# Patient Record
Sex: MALE | Race: WHITE | NOT HISPANIC OR LATINO | URBAN - METROPOLITAN AREA
[De-identification: names, ages, dates, MRNs, and addresses within clinical notes are randomized per-mention and may not be internally consistent; named-entity substitution may affect disease eponyms.]

---

## 2017-06-16 ENCOUNTER — AMBULATORY - HEALTHEAST (OUTPATIENT)
Dept: CARDIOLOGY | Facility: CLINIC | Age: 42
End: 2017-06-16

## 2017-06-16 DIAGNOSIS — E78.5 HYPERLIPIDEMIA: ICD-10-CM

## 2017-06-16 LAB
CHOLEST SERPL-MCNC: 204 MG/DL
FASTING STATUS PATIENT QL REPORTED: YES
HDLC SERPL-MCNC: 49 MG/DL
LDLC SERPL CALC-MCNC: 131 MG/DL
TRIGL SERPL-MCNC: 120 MG/DL

## 2017-06-19 ENCOUNTER — COMMUNICATION - HEALTHEAST (OUTPATIENT)
Dept: CARDIOLOGY | Facility: CLINIC | Age: 42
End: 2017-06-19

## 2017-06-29 ENCOUNTER — OFFICE VISIT - HEALTHEAST (OUTPATIENT)
Dept: CARDIOLOGY | Facility: CLINIC | Age: 42
End: 2017-06-29

## 2017-06-29 DIAGNOSIS — Z95.2 H/O MITRAL VALVE REPLACEMENT: ICD-10-CM

## 2017-06-29 DIAGNOSIS — R00.2 PALPITATIONS: ICD-10-CM

## 2017-06-29 ASSESSMENT — MIFFLIN-ST. JEOR: SCORE: 1873.98

## 2017-12-06 ENCOUNTER — COMMUNICATION - HEALTHEAST (OUTPATIENT)
Dept: CARDIOLOGY | Facility: CLINIC | Age: 42
End: 2017-12-06

## 2017-12-06 DIAGNOSIS — Z98.890 H/O MITRAL VALVE REPAIR: ICD-10-CM

## 2017-12-06 DIAGNOSIS — R00.2 PALPITATIONS: ICD-10-CM

## 2018-05-31 ENCOUNTER — COMMUNICATION - HEALTHEAST (OUTPATIENT)
Dept: CARDIOLOGY | Facility: CLINIC | Age: 43
End: 2018-05-31

## 2018-05-31 DIAGNOSIS — Z98.890 H/O MITRAL VALVE REPAIR: ICD-10-CM

## 2018-05-31 DIAGNOSIS — R00.2 PALPITATIONS: ICD-10-CM

## 2018-06-05 ENCOUNTER — COMMUNICATION - HEALTHEAST (OUTPATIENT)
Dept: ADMINISTRATIVE | Facility: CLINIC | Age: 43
End: 2018-06-05

## 2018-06-12 ENCOUNTER — COMMUNICATION - HEALTHEAST (OUTPATIENT)
Dept: CARDIOLOGY | Facility: CLINIC | Age: 43
End: 2018-06-12

## 2018-06-12 DIAGNOSIS — Z95.2 MITRAL VALVE REPLACED: ICD-10-CM

## 2018-07-18 ENCOUNTER — COMMUNICATION - HEALTHEAST (OUTPATIENT)
Dept: CARDIOLOGY | Facility: CLINIC | Age: 43
End: 2018-07-18

## 2018-07-18 ENCOUNTER — HOSPITAL ENCOUNTER (OUTPATIENT)
Dept: CARDIOLOGY | Facility: CLINIC | Age: 43
Discharge: HOME | End: 2018-07-18
Attending: INTERNAL MEDICINE

## 2018-07-18 DIAGNOSIS — Z95.2 MITRAL VALVE REPLACED: ICD-10-CM

## 2018-07-18 LAB
BSA FOR ECHO PROCEDURE: 2.18 M2
CV BLOOD PRESSURE: NORMAL MMHG
CV ECHO HEIGHT: 72 IN
CV ECHO WEIGHT: 206 LBS
DOP CALC MV VTI: 45.4 CM
DOP CALC MV VTI: 45.4 CM
EJECTION FRACTION: 58 % (ref 55–75)
LEFT ATRIUM SIZE: 3.6 CM
LEFT VENTRICLE DIASTOLIC VOLUME INDEX: 65.1 CM3/M2 (ref 34–74)
LEFT VENTRICLE DIASTOLIC VOLUME: 142 CM3 (ref 62–150)
LEFT VENTRICLE SYSTOLIC VOLUME INDEX: 27.5 CM3/M2 (ref 11–31)
LEFT VENTRICLE SYSTOLIC VOLUME: 60 CM3 (ref 21–61)
MITRAL VALVE DECELERATION SLOPE: 4670 MM/S2
MITRAL VALVE DECELERATION SLOPE: 4670 MM/S2
MITRAL VALVE E/A RATIO: 2
MITRAL VALVE MEAN INFLOW VELOCITY: 78.4 CM/S
MITRAL VALVE MEAN INFLOW VELOCITY: 78.4 CM/S
MITRAL VALVE PEAK VELOCITY: 149 CM/S
MITRAL VALVE PEAK VELOCITY: 149 CM/S
MITRAL VALVE PRESSURE HALF-TIME: 89 MS
MV MEAN GRADIENT: 4 MMHG
MV PEAK A VELOCITY: 0.8 CM/S
MV PEAK E VELOCITY: 1.6 CM/S
MV VALVE AREA PRESSURE 1/2 METHOD: 2.5 CM2
NUC REST DIASTOLIC VOLUME INDEX: 3296 LBS
NUC REST SYSTOLIC VOLUME INDEX: 72 IN
TRICUSPID REGURGITATION PEAK PRESSURE GRADIENT: 14.4 MMHG
TRICUSPID VALVE ANULAR PLANE SYSTOLIC EXCURSION: 2.5 CM
TRICUSPID VALVE PEAK REGURGITANT VELOCITY: 190 CM/S

## 2018-07-18 ASSESSMENT — MIFFLIN-ST. JEOR: SCORE: 1862.41

## 2018-07-25 ENCOUNTER — OFFICE VISIT - HEALTHEAST (OUTPATIENT)
Dept: CARDIOLOGY | Facility: CLINIC | Age: 43
End: 2018-07-25

## 2018-07-25 DIAGNOSIS — Z98.890 S/P MVR (MITRAL VALVE REPAIR): ICD-10-CM

## 2018-07-25 ASSESSMENT — MIFFLIN-ST. JEOR: SCORE: 1876.02

## 2018-07-26 LAB
ATRIAL RATE - MUSE: 51 BPM
DIASTOLIC BLOOD PRESSURE - MUSE: NORMAL MMHG
INTERPRETATION ECG - MUSE: NORMAL
P AXIS - MUSE: 46 DEGREES
PR INTERVAL - MUSE: 222 MS
QRS DURATION - MUSE: 98 MS
QT - MUSE: 452 MS
QTC - MUSE: 416 MS
R AXIS - MUSE: 80 DEGREES
SYSTOLIC BLOOD PRESSURE - MUSE: NORMAL MMHG
T AXIS - MUSE: 33 DEGREES
VENTRICULAR RATE- MUSE: 51 BPM

## 2018-08-29 ENCOUNTER — OFFICE VISIT - HEALTHEAST (OUTPATIENT)
Dept: FAMILY MEDICINE | Facility: CLINIC | Age: 43
End: 2018-08-29

## 2018-08-29 DIAGNOSIS — E78.00 HYPERCHOLESTEREMIA: ICD-10-CM

## 2018-08-29 DIAGNOSIS — Z95.2 H/O MITRAL VALVE REPLACEMENT: ICD-10-CM

## 2018-08-29 DIAGNOSIS — F41.9 ANXIETY: ICD-10-CM

## 2018-08-29 DIAGNOSIS — R00.2 PALPITATIONS: ICD-10-CM

## 2018-08-29 DIAGNOSIS — E66.3 OVERWEIGHT (BMI 25.0-29.9): ICD-10-CM

## 2018-08-29 DIAGNOSIS — Z00.00 ROUTINE GENERAL MEDICAL EXAMINATION AT A HEALTH CARE FACILITY: ICD-10-CM

## 2018-08-29 LAB
ANION GAP SERPL CALCULATED.3IONS-SCNC: 10 MMOL/L (ref 5–18)
BUN SERPL-MCNC: 9 MG/DL (ref 8–22)
CALCIUM SERPL-MCNC: 10.1 MG/DL (ref 8.5–10.5)
CHLORIDE BLD-SCNC: 103 MMOL/L (ref 98–107)
CHOLEST SERPL-MCNC: 231 MG/DL
CO2 SERPL-SCNC: 26 MMOL/L (ref 22–31)
CREAT SERPL-MCNC: 0.85 MG/DL (ref 0.7–1.3)
FASTING STATUS PATIENT QL REPORTED: YES
GFR SERPL CREATININE-BSD FRML MDRD: >60 ML/MIN/1.73M2
GLUCOSE BLD-MCNC: 92 MG/DL (ref 70–125)
HDLC SERPL-MCNC: 58 MG/DL
LDLC SERPL CALC-MCNC: 160 MG/DL
MAGNESIUM SERPL-MCNC: 2.1 MG/DL (ref 1.8–2.6)
POTASSIUM BLD-SCNC: 4.6 MMOL/L (ref 3.5–5)
SODIUM SERPL-SCNC: 139 MMOL/L (ref 136–145)
TRIGL SERPL-MCNC: 63 MG/DL
TSH SERPL DL<=0.005 MIU/L-ACNC: 1.94 UIU/ML (ref 0.3–5)

## 2018-08-29 RX ORDER — CLONAZEPAM 1 MG/1
0.5-1 TABLET ORAL 2 TIMES DAILY PRN
Qty: 30 TABLET | Refills: 1 | Status: SHIPPED | OUTPATIENT
Start: 2018-08-29

## 2018-08-29 ASSESSMENT — MIFFLIN-ST. JEOR: SCORE: 1861.28

## 2018-11-22 ENCOUNTER — COMMUNICATION - HEALTHEAST (OUTPATIENT)
Dept: CARDIOLOGY | Facility: CLINIC | Age: 43
End: 2018-11-22

## 2018-11-22 DIAGNOSIS — R00.2 PALPITATIONS: ICD-10-CM

## 2018-11-22 DIAGNOSIS — Z98.890 H/O MITRAL VALVE REPAIR: ICD-10-CM

## 2019-05-15 ENCOUNTER — COMMUNICATION - HEALTHEAST (OUTPATIENT)
Dept: CARDIOLOGY | Facility: CLINIC | Age: 44
End: 2019-05-15

## 2019-05-15 DIAGNOSIS — R00.2 PALPITATIONS: ICD-10-CM

## 2019-05-15 DIAGNOSIS — Z98.890 H/O MITRAL VALVE REPAIR: ICD-10-CM

## 2019-06-11 ENCOUNTER — COMMUNICATION - HEALTHEAST (OUTPATIENT)
Dept: CARDIOLOGY | Facility: CLINIC | Age: 44
End: 2019-06-11

## 2019-06-11 DIAGNOSIS — Z98.890 H/O MITRAL VALVE REPAIR: ICD-10-CM

## 2019-06-11 DIAGNOSIS — R00.2 PALPITATIONS: ICD-10-CM

## 2019-09-20 ENCOUNTER — COMMUNICATION - HEALTHEAST (OUTPATIENT)
Dept: CARDIOLOGY | Facility: CLINIC | Age: 44
End: 2019-09-20

## 2019-09-20 DIAGNOSIS — Z98.890 H/O MITRAL VALVE REPAIR: ICD-10-CM

## 2019-09-20 DIAGNOSIS — R00.2 PALPITATIONS: ICD-10-CM

## 2020-09-11 ENCOUNTER — COMMUNICATION - HEALTHEAST (OUTPATIENT)
Dept: FAMILY MEDICINE | Facility: CLINIC | Age: 45
End: 2020-09-11

## 2020-09-11 DIAGNOSIS — Z98.890 H/O MITRAL VALVE REPAIR: ICD-10-CM

## 2020-09-11 DIAGNOSIS — R00.2 PALPITATIONS: ICD-10-CM

## 2020-09-12 ENCOUNTER — AMBULATORY - HEALTHEAST (OUTPATIENT)
Dept: FAMILY MEDICINE | Facility: CLINIC | Age: 45
End: 2020-09-12

## 2020-12-17 ENCOUNTER — COMMUNICATION - HEALTHEAST (OUTPATIENT)
Dept: FAMILY MEDICINE | Facility: CLINIC | Age: 45
End: 2020-12-17

## 2020-12-17 DIAGNOSIS — Z98.890 H/O MITRAL VALVE REPAIR: ICD-10-CM

## 2020-12-17 DIAGNOSIS — R00.2 PALPITATIONS: ICD-10-CM

## 2021-03-15 ENCOUNTER — COMMUNICATION - HEALTHEAST (OUTPATIENT)
Dept: FAMILY MEDICINE | Facility: CLINIC | Age: 46
End: 2021-03-15

## 2021-03-15 DIAGNOSIS — R00.2 PALPITATIONS: ICD-10-CM

## 2021-03-15 DIAGNOSIS — Z98.890 H/O MITRAL VALVE REPAIR: ICD-10-CM

## 2021-04-13 ENCOUNTER — COMMUNICATION - HEALTHEAST (OUTPATIENT)
Dept: FAMILY MEDICINE | Facility: CLINIC | Age: 46
End: 2021-04-13

## 2021-04-13 DIAGNOSIS — R00.2 PALPITATIONS: ICD-10-CM

## 2021-04-13 DIAGNOSIS — Z98.890 H/O MITRAL VALVE REPAIR: ICD-10-CM

## 2021-05-07 ENCOUNTER — RECORDS - HEALTHEAST (OUTPATIENT)
Dept: FAMILY MEDICINE | Facility: CLINIC | Age: 46
End: 2021-05-07

## 2021-05-31 VITALS — BODY MASS INDEX: 27.41 KG/M2 | HEIGHT: 73 IN | WEIGHT: 206.8 LBS

## 2021-06-01 VITALS — BODY MASS INDEX: 27.9 KG/M2 | WEIGHT: 206 LBS | HEIGHT: 72 IN

## 2021-06-01 VITALS — WEIGHT: 209 LBS | HEIGHT: 72 IN | BODY MASS INDEX: 28.31 KG/M2

## 2021-06-02 ENCOUNTER — RECORDS - HEALTHEAST (OUTPATIENT)
Dept: ADMINISTRATIVE | Facility: OTHER | Age: 46
End: 2021-06-02

## 2021-06-02 VITALS — WEIGHT: 204 LBS | BODY MASS INDEX: 27.04 KG/M2 | HEIGHT: 73 IN

## 2021-06-02 DIAGNOSIS — R00.2 PALPITATIONS: ICD-10-CM

## 2021-06-02 DIAGNOSIS — Z98.890 H/O MITRAL VALVE REPAIR: ICD-10-CM

## 2021-06-02 RX ORDER — ATENOLOL 50 MG/1
TABLET ORAL
Qty: 15 TABLET | Refills: 1 | Status: SHIPPED | OUTPATIENT
Start: 2021-06-02

## 2021-06-11 NOTE — PROGRESS NOTES
[facility] Progress Note    Assessment:  1.  History of mitral valve repair in 2005 for progressive mitral insufficiency [facility].  Clinically doing well.  He will continue to monitor for symptoms.  Echocardiogram from June 2015 demonstrated normal left ventricular systolic function and mild mitral insufficiency.  He has been on atenolol for palpitations and palpitation symptoms have continued to be quiescent.  He continues with antibiotic prophylaxis for dental procedures.    2.  Hyperlipidemia.  Lipid results completed recently demonstrates an improvement cholesterol 204 and an LDL cholesterol 131 compared to 169 in September 2016.  He will continue with a regular prudent diet and exercise as we discussed and reviewed.        Plan: Follow-up 1 year or sooner specific issues were to arise    1. H/O mitral valve replacement     2. Palpitations           An After Visit Summary was printed and given to the patient.    Subjective:    Uvaldo Brown is a 42 y.o. male who returned for a planned  follow up visit.  He reports that he has been feeling very well.  Has been walking/jogging upwards of 5-6 miles per day without specific cardiac complaints.  He underwent mitral valve repair in 2005 [facility] for progressive mitral insufficiency.  The most recent echocardiogram from 2015 demonstrated mild mitral insufficiency and stable valve repair characteristics.  His lipid panel is reviewed from today and lipids are improved.  Calculated his 10 year cardiac risk which would be estimated at 0.9%.    Review of Systems:   General: WNL  Eyes: WNL  Ears/Nose/Throat: WNL  Lungs: WNL  Heart: WNL  Stomach: WNL  Bladder: WNL  Muscle/Joints: WNL  Skin: WNL  Nervous System: WNL  Mental Health: WNL            Problem List:    Patient Active Problem List   Diagnosis     Anxiety     Palpitations     Overweight (BMI 25.0-29.9)     H/O mitral valve replacement     Hypercholesteremia     Tongue swelling  "      Social History     Social History     Marital status:      Spouse name: N/A     Number of children: N/A     Years of education: N/A     Occupational History     Not on file.     Social History Main Topics     Smoking status: Former Smoker     Smokeless tobacco: Not on file     Alcohol use Not on file     Drug use: Not on file     Sexual activity: Not on file     Other Topics Concern     Not on file     Social History Narrative       Family History   Problem Relation Age of Onset     Adopted: Yes       Current Outpatient Prescriptions   Medication Sig Dispense Refill     aspirin 81 MG EC tablet Take 81 mg by mouth daily.       atenolol (TENORMIN) 50 MG tablet Take 0.5 tablets (25 mg total) by mouth daily. 45 tablet 5     clindamycin (CLEOCIN) 300 MG capsule Pre dental       clonazePAM (KLONOPIN) 0.25 MG disintegrating tablet Take 0.25 mg by mouth 2 (two) times a day as needed for anxiety.       No current facility-administered medications for this visit.        Objective:     /80 (Patient Site: Left Arm, Patient Position: Sitting, Cuff Size: Adult Regular)  Pulse (!) 55  Resp 16  Ht 6' 0.5\" (1.842 m)  Wt 206 lb 12.8 oz (93.8 kg)  BMI 27.66 kg/m2  206 lb 12.8 oz (93.8 kg)       Physical Exam:    GENERAL APPEARANCE: alert, no apparent distress  HEENT: no scleral icterus or xanthelasma  NECK: jugular venous pressure within normal limits  CHEST: symmetric, the lungs are clear to auscultation, chest wall well-healed  CARDIOVASCULAR: regular rhythm with 1 to 2/6 systolic murmur at the left ventricular apex; no carotid bruits  Abdomen: No Organomegaly, masses, bruits, or tenderness. Bowels sounds are present      EXTREMITIES: no cyanosis, clubbing or edema    Cardiac Testing:  Procedure Date  Date: 06/12/2015 Start: 01:55 PM     Study Location: [facility]  Technical Quality: Adequate visualization     Patient Status: Routine     Height: 73 inches Weight: 200 pounds BSA: 2.15 m^2 BMI: 26.39 " kg/m^2     Indications  Mitral valve disorder.      Conclusions       Summary   Immobile and shortened posterior leaflet c/w repair.   Mild mitral regurgitation.   Mild diastolic gradient, no significant mitral stenosis.   Normal left ventricular size and systolic function.   Normal left ventricular wall thickness.   Left ventricular ejection fraction is calculated to be 57%.   No regional wall motion abnormalities.   This study was compared with a previously done echocardiogram 1/23/14. The   results are similar.    Lab Results:    Lab Results   Component Value Date     09/22/2016    K 4.8 09/22/2016     09/22/2016    CO2 25 09/22/2016    BUN 11 09/22/2016    CREATININE 0.92 09/22/2016    CALCIUM 9.7 09/22/2016     Lab Results   Component Value Date    CHOL 204 (H) 06/16/2017    TRIG 120 06/16/2017    HDL 49 06/16/2017     No results found for: BNP  Creatinine (mg/dL)   Date Value   09/22/2016 0.92   06/24/2015 0.95     LDL Calculated (mg/dL)   Date Value   06/16/2017 131 (H)   09/22/2016 169 (H)   06/24/2015 149 (H)     Lab Results   Component Value Date    WBC 5.4 09/22/2016    WBC 4.0 06/24/2015    HGB 16.0 09/22/2016    HCT 47.0 09/22/2016    MCV 89 09/22/2016     09/22/2016               This note has been dictated using voice recognition software. Any grammatical or context distortions are unintentional and inherent to the software.      Uvaldo Friedman M.D., F.A.C.C.  [facility], [place]  [phone]

## 2021-06-11 NOTE — TELEPHONE ENCOUNTER
RN cannot approve Refill Request    RN can NOT refill this medication PCP messaged that patient is overdue for Labs. Last office visit: Visit date not found Last Physical: 8/29/2018 Last MTM visit: Visit date not found Last visit same specialty: Visit date not found.  Next visit within 3 mo: Visit date not found  Next physical within 3 mo: Visit date not found      Anna Chandler, [facility] Triage/Med Refill 9/12/2020    Requested Prescriptions   Pending Prescriptions Disp Refills     atenoloL (TENORMIN) 50 MG tablet 45 tablet 3     Sig: Take 0.5 tablets (25 mg total) by mouth daily.       Beta-Blockers Refill Protocol Failed - 9/11/2020  3:45 PM        Failed - PCP or prescribing provider visit in past 12 months or next 3 months     Last office visit with prescriber/PCP: Visit date not found OR same dept: Visit date not found OR same specialty: Visit date not found  Last physical: 8/29/2018 Last MTM visit: Visit date not found   Next visit within 3 mo: Visit date not found  Next physical within 3 mo: Visit date not found  Prescriber OR PCP: Pritesh Lira MD  Last diagnosis associated with med order: 1. H/O mitral valve repair  - atenoloL (TENORMIN) 50 MG tablet; Take 0.5 tablets (25 mg total) by mouth daily.  Dispense: 45 tablet; Refill: 3    2. Palpitations  - atenoloL (TENORMIN) 50 MG tablet; Take 0.5 tablets (25 mg total) by mouth daily.  Dispense: 45 tablet; Refill: 3    If protocol passes may refill for 12 months if within 3 months of last provider visit (or a total of 15 months).             Failed - Blood pressure filed in past 12 months     BP Readings from Last 1 Encounters:   08/29/18 110/60

## 2021-06-13 NOTE — TELEPHONE ENCOUNTER
RN cannot approve Refill Request    RN can NOT refill this medication PCP messaged that patient is overdue for Office Visit. Last office visit: Visit date not found Last Physical: 8/29/2018 Last MTM visit: Visit date not found Last visit same specialty: Visit date not found.  Next visit within 3 mo: Visit date not found  Next physical within 3 mo: Visit date not found      Anna Chandler, [facility] Triage/Med Refill 12/19/2020    Requested Prescriptions   Pending Prescriptions Disp Refills     atenoloL (TENORMIN) 50 MG tablet [Pharmacy Med Name: ATENOLOL 50 MG TABLET] 45 tablet 0     Sig: TAKE 1/2 TABLET BY MOUTH DAILY       Beta-Blockers Refill Protocol Failed - 12/17/2020  8:32 AM        Failed - PCP or prescribing provider visit in past 12 months or next 3 months     Last office visit with prescriber/PCP: Visit date not found OR same dept: Visit date not found OR same specialty: Visit date not found  Last physical: 8/29/2018 Last MTM visit: Visit date not found   Next visit within 3 mo: Visit date not found  Next physical within 3 mo: Visit date not found  Prescriber OR PCP: Pritesh Lira MD  Last diagnosis associated with med order: 1. H/O mitral valve repair  - atenoloL (TENORMIN) 50 MG tablet [Pharmacy Med Name: ATENOLOL 50 MG TABLET]; TAKE 1/2 TABLET BY MOUTH DAILY  Dispense: 45 tablet; Refill: 0    2. Palpitations  - atenoloL (TENORMIN) 50 MG tablet [Pharmacy Med Name: ATENOLOL 50 MG TABLET]; TAKE 1/2 TABLET BY MOUTH DAILY  Dispense: 45 tablet; Refill: 0    If protocol passes may refill for 12 months if within 3 months of last provider visit (or a total of 15 months).             Failed - Blood pressure filed in past 12 months     BP Readings from Last 1 Encounters:   08/29/18 110/60

## 2021-06-15 NOTE — TELEPHONE ENCOUNTER
RN cannot approve Refill Request    RN can NOT refill this medication Protocol failed and NO refill given. Last office visit: Visit date not found Last Physical: 8/29/2018 Last MTM visit: Visit date not found Last visit same specialty: Visit date not found.  Next visit within 3 mo: Visit date not found  Next physical within 3 mo: Visit date not found      Evan Mcintyre, [facility] Triage/Med Refill 3/15/2021    Requested Prescriptions   Pending Prescriptions Disp Refills     atenoloL (TENORMIN) 50 MG tablet [Pharmacy Med Name: ATENOLOL 50 MG TABLET] 45 tablet 0     Sig: TAKE 1/2 TABLET BY MOUTH EVERY DAY       Beta-Blockers Refill Protocol Failed - 3/15/2021  2:35 AM        Failed - PCP or prescribing provider visit in past 12 months or next 3 months     Last office visit with prescriber/PCP: Visit date not found OR same dept: Visit date not found OR same specialty: Visit date not found  Last physical: 8/29/2018 Last MTM visit: Visit date not found   Next visit within 3 mo: Visit date not found  Next physical within 3 mo: Visit date not found  Prescriber OR PCP: Pritesh Lira MD  Last diagnosis associated with med order: 1. H/O mitral valve repair  - atenoloL (TENORMIN) 50 MG tablet [Pharmacy Med Name: ATENOLOL 50 MG TABLET]; TAKE 1/2 TABLET BY MOUTH EVERY DAY  Dispense: 45 tablet; Refill: 0    2. Palpitations  - atenoloL (TENORMIN) 50 MG tablet [Pharmacy Med Name: ATENOLOL 50 MG TABLET]; TAKE 1/2 TABLET BY MOUTH EVERY DAY  Dispense: 45 tablet; Refill: 0    If protocol passes may refill for 12 months if within 3 months of last provider visit (or a total of 15 months).             Failed - Blood pressure filed in past 12 months     BP Readings from Last 1 Encounters:   08/29/18 110/60

## 2021-06-16 NOTE — TELEPHONE ENCOUNTER
RN cannot approve Refill Request    RN can NOT refill this medication PCP messaged that patient is overdue for Office Visit. Last office visit: Visit date not found Last Physical: 8/29/2018 Last MTM visit: Visit date not found Last visit same specialty: Visit date not found.  Next visit within 3 mo: Visit date not found  Next physical within 3 mo: Visit date not found      Anna Chandler, [facility] Triage/Med Refill 4/13/2021    Requested Prescriptions   Pending Prescriptions Disp Refills     atenoloL (TENORMIN) 50 MG tablet [Pharmacy Med Name: ATENOLOL 50 MG TABLET] 15 tablet 0     Sig: TAKE 1/2 TABLET BY MOUTH EVERY DAY       Beta-Blockers Refill Protocol Failed - 4/13/2021 10:31 AM        Failed - PCP or prescribing provider visit in past 12 months or next 3 months     Last office visit with prescriber/PCP: Visit date not found OR same dept: Visit date not found OR same specialty: Visit date not found  Last physical: 8/29/2018 Last MTM visit: Visit date not found   Next visit within 3 mo: Visit date not found  Next physical within 3 mo: Visit date not found  Prescriber OR PCP: Pritesh Lira MD  Last diagnosis associated with med order: 1. H/O mitral valve repair  - atenoloL (TENORMIN) 50 MG tablet [Pharmacy Med Name: ATENOLOL 50 MG TABLET]; TAKE 1/2 TABLET BY MOUTH EVERY DAY  Dispense: 15 tablet; Refill: 0    2. Palpitations  - atenoloL (TENORMIN) 50 MG tablet [Pharmacy Med Name: ATENOLOL 50 MG TABLET]; TAKE 1/2 TABLET BY MOUTH EVERY DAY  Dispense: 15 tablet; Refill: 0    If protocol passes may refill for 12 months if within 3 months of last provider visit (or a total of 15 months).             Failed - Blood pressure filed in past 12 months     BP Readings from Last 1 Encounters:   08/29/18 110/60

## 2021-06-17 NOTE — TELEPHONE ENCOUNTER
RN cannot approve Refill Request    RN can NOT refill this medication PCP messaged that patient is overdue for Office Visit and blood pressure check. . Last office visit: Visit date not found Last Physical: 8/29/2018 Last MTM visit: Visit date not found Last visit same specialty: Visit date not found.  Next visit within 3 mo: Visit date not found  Next physical within 3 mo: Visit date not found      Pinky Lovell, [facility] Triage/Med Refill 5/7/2021    Requested Prescriptions   Pending Prescriptions Disp Refills     atenoloL (TENORMIN) 50 MG tablet [Pharmacy Med Name: ATENOLOL 50 MG TABLET] 15 tablet 0     Sig: TAKE 1/2 TABLET BY MOUTH EVERY DAY       Beta-Blockers Refill Protocol Failed - 5/7/2021 10:31 AM        Failed - PCP or prescribing provider visit in past 12 months or next 3 months     Last office visit with prescriber/PCP: Visit date not found OR same dept: Visit date not found OR same specialty: Visit date not found  Last physical: 8/29/2018 Last MTM visit: Visit date not found   Next visit within 3 mo: Visit date not found  Next physical within 3 mo: Visit date not found  Prescriber OR PCP: Pritesh Lira MD  Last diagnosis associated with med order: 1. H/O mitral valve repair  - atenoloL (TENORMIN) 50 MG tablet [Pharmacy Med Name: ATENOLOL 50 MG TABLET]; TAKE 1/2 TABLET BY MOUTH EVERY DAY  Dispense: 15 tablet; Refill: 0    2. Palpitations  - atenoloL (TENORMIN) 50 MG tablet [Pharmacy Med Name: ATENOLOL 50 MG TABLET]; TAKE 1/2 TABLET BY MOUTH EVERY DAY  Dispense: 15 tablet; Refill: 0    If protocol passes may refill for 12 months if within 3 months of last provider visit (or a total of 15 months).             Failed - Blood pressure filed in past 12 months     BP Readings from Last 1 Encounters:   08/29/18 110/60

## 2021-06-19 NOTE — PROGRESS NOTES
[facility] Progress Note    Assessment:  1.  History of mitral valve repair in 2005 for progressive mitral insufficiency.  Clinically doing well.  Most recent echocardiogram continues to demonstrate normal left ventricular systolic function with good mitral valve repair and mild mitral regurgitation.  He continues with antibiotic prophylaxis for dental procedures.    2.  Hyperlipidemia.  He has no other identified risk factors.  We did talk about the potential for coronary calcium scoring and its potential usefulness to further risk stratify.  He was going to do some additional reading in this regard.    3.  Rare episode of exertional right arm discomfort.  We talked about potential for stress testing.  I reviewed this in detail.  His current preference was to monitor the symptom but update me if he has more frequent episodes.  I encouraged him to have a low threshold to pursue stress testing.        Plan:    1. S/P MVR (mitral valve repair)  [facility] - Today         An After Visit Summary was printed and given to the patient.    Subjective:    Uvaldo Brown is a 43 y.o. male who returned for a planned  follow up visit.  He reports feeling well.  He continues to be active is running regularly.  He does tell me that on rare occasions he has a right arm discomfort while running.  This is happened on 2 occasions approximately 1 month apart.  He states that he runs almost daily and has no specific symptoms most of the time.  He is status post mitral valve repair in 2005 for progressive mitral insufficiency at [facility].  He has had no complaints of tachypalpitations.  He has been on atenolol because of a history of palpitations which have been quiescent.    He has a history of elevated cholesterol.  Most recent cholesterol June 2017 mistreated some improvement from the prior study.  Decreased from 243-204 and an LDL of 169 to 131.    Review of Systems:   General: WNL  Eyes:  WNL  Ears/Nose/Throat: WNL  Lungs: WNL  Heart: WNL  Stomach: WNL  Bladder: WNL  Muscle/Joints: WNL  Skin: WNL  Nervous System: WNL  Mental Health: WNL     Blood: WNL      Problem List:    Patient Active Problem List   Diagnosis     Anxiety     Palpitations     Overweight (BMI 25.0-29.9)     H/O mitral valve replacement     Hypercholesteremia     Tongue swelling       Social History     Social History     Marital status:      Spouse name: N/A     Number of children: N/A     Years of education: N/A     Occupational History     Not on file.     Social History Main Topics     Smoking status: Former Smoker     Smokeless tobacco: Never Used     Alcohol use Not on file     Drug use: No     Sexual activity: Not on file     Other Topics Concern     Not on file     Social History Narrative       Family History   Problem Relation Age of Onset     Adopted: Yes       Current Outpatient Prescriptions   Medication Sig Dispense Refill     aspirin 81 MG EC tablet Take 81 mg by mouth daily.       atenolol (TENORMIN) 50 MG tablet TAKE 1/2 TABLETS (25 MG TOTAL) DAILY. 45 tablet 1     clindamycin (CLEOCIN) 300 MG capsule Pre dental       clonazePAM (KLONOPIN) 0.25 MG disintegrating tablet Take 0.25 mg by mouth 2 (two) times a day as needed for anxiety.       No current facility-administered medications for this visit.        Objective:     /72 (Patient Site: Right Arm, Patient Position: Sitting, Cuff Size: Adult Regular)  Pulse (!) 56  Resp 16  Ht 6' (1.829 m)  Wt 209 lb (94.8 kg)  BMI 28.35 kg/m2  209 lb (94.8 kg)       Physical Exam:    GENERAL APPEARANCE: alert, no apparent distress  HEENT: no scleral icterus or xanthelasma  NECK: jugular venous pressure within normal limits  CHEST: symmetric, the lungs are clear to auscultation  CARDIOVASCULAR: regular rhythm with soft murmur, S4; no carotid bruits  Abdomen: No Organomegaly, masses, bruits, or tenderness. Bowels sounds are present      EXTREMITIES: no cyanosis,  clubbing or edema    Cardiac Testing:  Patient Information      Patient Name MRN Sex              Age     Uvaldo Brown [id number] Male 1975 (43 y.o.)       Indications      Heart valve replacement     Dx: Mitral valve replaced [Z95.2 (ICD-10-CM)]       Summary        Left ventricle ejection fraction is normal. The calculated left ventricular ejection fraction is 58% without wall motion abnormality.    Thickening and limited mobility the posterior mitral valve leaflet consistent with prior repair. Mild eccentric mitral regurgitation noted.    When compared to the previous study dated 2015, no significant change.     Sinus bradycardia at 51 bpm, first-degree AV block, RSR prime noted.    Lab Results:    Lab Results   Component Value Date     2016    K 4.8 2016     2016    CO2 25 2016    BUN 11 2016    CREATININE 0.92 2016    CALCIUM 9.7 2016     Lab Results   Component Value Date    CHOL 204 (H) 2017    TRIG 120 2017    HDL 49 2017     No results found for: BNP  Creatinine (mg/dL)   Date Value   2016 0.92   2015 0.95     LDL Calculated (mg/dL)   Date Value   2017 131 (H)   2016 169 (H)   2015 149 (H)     Lab Results   Component Value Date    WBC 5.4 2016    WBC 4.0 2015    HGB 16.0 2016    HCT 47.0 2016    MCV 89 2016     2016               This note has been dictated using voice recognition software. Any grammatical or context distortions are unintentional and inherent to the software.      Uvaldo Friedman M.D., F.A.C.C.  [facility] [place] [facility]  [phone]

## 2021-06-20 NOTE — PROGRESS NOTES
Assessment/Plan:     1. Routine general medical examination at a health care facility  Routine healthcare maintenance.  Preventative cares reviewed.  Tetanus booster provided.  Patient will receive seasonal flu shot through work.  Annual physical exams to continue.  - Td, Preservative Free (green label)    2. Overweight (BMI 25.0-29.9)  Dietary and exercise modification for weight goal less than 195 pounds initially, less than 190 pounds ideally.    3. Palpitations  History of palpitations, improved.  Check basic metabolic, TSH and magnesium levels.  - Basic Metabolic Panel  - Magnesium  - Thyroid Stimulating Hormone (TSH)    4. Hypercholesteremia  History of hypercholesterolemia, diet controlled.  Check lipid cascade today while fasting.  - Lipid Cascade    5. Anxiety  Refill provided for clonazepam 1 mg using 1/2  tablet to 1 tablet twice daily as needed for social anxiety.  - clonazePAM (KLONOPIN) 1 MG tablet; Take 0.5-1 tablets (0.5-1 mg total) by mouth 2 (two) times a day as needed for anxiety.  Dispense: 30 tablet; Refill: 1    6.  History of mitral valve replacement  Seen by Dr. Friedman July 25, 2018.  Recent echocardiogram as noted below without evidence for cardiomyopathy etc.     The following high BMI interventions were performed this visit: encouragement to exercise, weight monitoring, weight loss from baseline weight and lifestyle education regarding diet             Subjective:      Uvaldo Brown is a 43 y.o. male who presents for an annual exam.  In general doing well.  Needs refill on clonazepam.  Uses it occasionally for social anxiety however could go a month or so without needing it.  Recently saw Dr. Friedman July 25, 2018..  He is status post mitral valve repair in 2005 for progressive mitral insufficiency at [facility].  Occasional palpitations, improved.  No chest pain or shortness of breath currently.  Feels that right arm discomfort previously with exertion is resolved.  Denies  "recent illness.  Uses clindamycin pre-dental per dental recommendation however told by cardiologist that would not be a requirement any longer.  Has had mild cholesterol elevation historically, diet-controlled with improvement previously noted.  Comprehensive review of systems as above otherwise all negative.      Assessment:  1.  History of mitral valve repair in 2005 for progressive mitral insufficiency.  Clinically doing well.  Most recent echocardiogram continues to demonstrate normal left ventricular systolic function with good mitral valve repair and mild mitral regurgitation.  He continues with antibiotic prophylaxis for dental procedures.     2.  Hyperlipidemia.  He has no other identified risk factors.  We did talk about the potential for coronary calcium scoring and its potential usefulness to further risk stratify.  He was going to do some additional reading in this regard.     3.  Rare episode of exertional right arm discomfort.  We talked about potential for stress testing.  I reviewed this in detail.  His current preference was to monitor the symptom but update me if he has more frequent episodes.  I encouraged him to have a low threshold to pursue stress testing.         Moved back from [place], [place], then moved back from [place]  Work for [employer] ()    \"[employer]\" x 6/07   No children   Traveling to [place] June, 2010 ([place] -> [facility])   Occ EtOH   No smoke   Adopted   Zoroastrianism: not recent   Ran 8 marathons (then wheezing and chest pain with running -- noted severe mitral regurg.)   Surgeries: s/p mitral valve repair 12/05; s/p wisdom teeth; left inguinal hernia repair while in [place]  2/17/10: PHQ-9 = 0.5/27    Healthy Habits:   Regular Exercise: Yes and ran 6 miles last night  Healthy Diet: Yes  Dental Visits Regularly: Yes  Seat Belt: Yes   Sexually active: Yes  Colonoscopy: N/A  Lipid Profile: Yes  Glucose Screen: Yes    Immunization History   Administered Date(s) " "Administered     Influenza [id number], 01/01/2010     Influenza, seasonal,quad inj 6-35 mos 11/01/2009     Tdap 05/10/2007     Immunization status: missing doses of flu shot (will get at work this season) and needs Td booster.  Vision Screening:both eyes and glasses  Hearing: PASS     Current Outpatient Prescriptions   Medication Sig Dispense Refill     aspirin 81 MG EC tablet Take 81 mg by mouth daily.       atenolol (TENORMIN) 50 MG tablet TAKE 1/2 TABLETS (25 MG TOTAL) DAILY. 45 tablet 1     clindamycin (CLEOCIN) 300 MG capsule Pre dental       clonazePAM (KLONOPIN) 1 MG tablet Take 0.5-1 tablets (0.5-1 mg total) by mouth 2 (two) times a day as needed for anxiety. 30 tablet 1     No current facility-administered medications for this visit.      Past Medical History:   Diagnosis Date     Anxiety      Mitral valve replaced      Palpitations      Past Surgical History:   Procedure Laterality Date     MITRAL VALVE REPLACEMENT       Penicillins  Family History   Problem Relation Age of Onset     Adopted: Yes     Social History     Social History     Marital status:      Spouse name: N/A     Number of children: N/A     Years of education: N/A     Occupational History     Not on file.     Social History Main Topics     Smoking status: Former Smoker     Smokeless tobacco: Never Used     Alcohol use Not on file     Drug use: No     Sexual activity: Not on file     Other Topics Concern     Not on file     Social History Narrative       Review of Systems  Comprehensive ROS: as above, otherwise all negative.           Objective:     /60  Pulse (!) 52  Ht 6' 0.5\" (1.842 m)  Wt 204 lb (92.5 kg)  SpO2 98%  BMI 27.29 kg/m2  Body mass index is 27.29 kg/(m^2).    Physical    General Appearance:    Alert, cooperative, no distress, appears stated age.  Overweight.  Mild baseline psychomotor agitation.   Head:    Normocephalic, without obvious abnormality, atraumatic   Eyes:    PERRL, conjunctiva/corneas clear, EOM's " intact, fundi     benign, both eyes.  Glasses.        Ears:    Normal TM's and external ear canals, both ears   Nose:   Nares normal, septum midline, mucosa normal, no drainage    or sinus tenderness   Throat:   Lips, mucosa, and tongue normal; teeth and gums normal   Neck:   Supple, symmetrical, trachea midline, no adenopathy;        thyroid:  No enlargement/tenderness/nodules; no carotid    bruit or JVD   Back:     Symmetric, no curvature, ROM normal, no CVA tenderness   Lungs:     Clear to auscultation bilaterally, respirations unlabored   Chest wall:    No tenderness or deformity   Heart:    Regular rate and rhythm, S1 and S2 normal, no murmur, rub   or gallop   Abdomen:     Soft, non-tender, bowel sounds active all four quadrants,     no masses, no organomegaly.     Genitalia:    Normal male without lesion, discharge or tenderness.  No inguinal hernia noted.     Rectal:    deferred   Extremities:   Extremities normal, atraumatic, no cyanosis or edema   Pulses:   2+ and symmetric all extremities   Skin:   Skin color, texture, turgor normal, no rashes or lesions   Lymph nodes:   Cervical, supraclavicular, and axillary nodes normal   Neurologic:   CNII-XII intact. Normal strength, sensation and reflexes       throughout         Echo 7/18/18 Summary     Left ventricle ejection fraction is normal. The calculated left ventricular ejection fraction is 58% without wall motion abnormality.    Thickening and limited mobility the posterior mitral valve leaflet consistent with prior repair. Mild eccentric mitral regurgitation noted.    When compared to the previous study dated 6/12/2015, no significant change.                This note has been dictated using voice recognition software and as a result may contain minor grammatical errors and unintended word substitutions.

## 2021-06-25 NOTE — TELEPHONE ENCOUNTER
RN cannot approve Refill Request    RN can NOT refill this medication PCP messaged that patient is overdue for Office Visit and BP. Last office visit: Visit date not found Last Physical: 8/29/2018 Last MTM visit: Visit date not found Last visit same specialty: Visit date not found.  Next visit within 3 mo: Visit date not found  Next physical within 3 mo: Visit date not found      Elaine Lopez, [facility] Triage/Med Refill 6/2/2021    Requested Prescriptions   Pending Prescriptions Disp Refills     atenoloL (TENORMIN) 50 MG tablet [Pharmacy Med Name: ATENOLOL 50 MG TABLET] 15 tablet 0     Sig: TAKE 1/2 TABLET BY MOUTH EVERY DAY       Beta-Blockers Refill Protocol Failed - 6/2/2021 10:31 AM        Failed - PCP or prescribing provider visit in past 12 months or next 3 months     Last office visit with prescriber/PCP: Visit date not found OR same dept: Visit date not found OR same specialty: Visit date not found  Last physical: 8/29/2018 Last MTM visit: Visit date not found   Next visit within 3 mo: Visit date not found  Next physical within 3 mo: Visit date not found  Prescriber OR PCP: Pritesh Lira MD  Last diagnosis associated with med order: 1. H/O mitral valve repair  - atenoloL (TENORMIN) 50 MG tablet [Pharmacy Med Name: ATENOLOL 50 MG TABLET]; TAKE 1/2 TABLET BY MOUTH EVERY DAY  Dispense: 15 tablet; Refill: 0    2. Palpitations  - atenoloL (TENORMIN) 50 MG tablet [Pharmacy Med Name: ATENOLOL 50 MG TABLET]; TAKE 1/2 TABLET BY MOUTH EVERY DAY  Dispense: 15 tablet; Refill: 0    If protocol passes may refill for 12 months if within 3 months of last provider visit (or a total of 15 months).             Failed - Blood pressure filed in past 12 months     BP Readings from Last 1 Encounters:   08/29/18 110/60

## 2021-06-26 ENCOUNTER — HEALTH MAINTENANCE LETTER (OUTPATIENT)
Age: 46
End: 2021-06-26

## 2021-06-27 ENCOUNTER — RECORDS - HEALTHEAST (OUTPATIENT)
Dept: FAMILY MEDICINE | Facility: CLINIC | Age: 46
End: 2021-06-27

## 2021-06-27 DIAGNOSIS — R00.2 PALPITATIONS: ICD-10-CM

## 2021-06-27 DIAGNOSIS — Z98.890 H/O MITRAL VALVE REPAIR: ICD-10-CM

## 2021-06-28 RX ORDER — ATENOLOL 50 MG/1
TABLET ORAL
Qty: 15 TABLET | Refills: 1 | Status: SHIPPED | OUTPATIENT
Start: 2021-06-28

## 2021-10-16 ENCOUNTER — HEALTH MAINTENANCE LETTER (OUTPATIENT)
Age: 46
End: 2021-10-16

## 2022-07-23 ENCOUNTER — HEALTH MAINTENANCE LETTER (OUTPATIENT)
Age: 47
End: 2022-07-23

## 2022-09-25 ENCOUNTER — HEALTH MAINTENANCE LETTER (OUTPATIENT)
Age: 47
End: 2022-09-25

## 2023-12-24 ENCOUNTER — HEALTH MAINTENANCE LETTER (OUTPATIENT)
Age: 48
End: 2023-12-24